# Patient Record
Sex: FEMALE | Race: WHITE | Employment: FULL TIME | URBAN - METROPOLITAN AREA
[De-identification: names, ages, dates, MRNs, and addresses within clinical notes are randomized per-mention and may not be internally consistent; named-entity substitution may affect disease eponyms.]

---

## 2018-07-30 ENCOUNTER — APPOINTMENT (OUTPATIENT)
Dept: GENERAL RADIOLOGY | Age: 38
End: 2018-07-30
Attending: PHYSICIAN ASSISTANT
Payer: COMMERCIAL

## 2018-07-30 ENCOUNTER — HOSPITAL ENCOUNTER (EMERGENCY)
Age: 38
Discharge: HOME | End: 2018-07-31
Attending: EMERGENCY MEDICINE
Payer: COMMERCIAL

## 2018-07-30 DIAGNOSIS — R73.9 HYPERGLYCEMIA: ICD-10-CM

## 2018-07-30 DIAGNOSIS — E11.622 DIABETIC SKIN ULCER (HCC): Primary | ICD-10-CM

## 2018-07-30 DIAGNOSIS — L98.499 DIABETIC SKIN ULCER (HCC): Primary | ICD-10-CM

## 2018-07-30 DIAGNOSIS — R03.0 ELEVATED BLOOD PRESSURE READING: ICD-10-CM

## 2018-07-30 LAB
ANION GAP SERPL CALC-SCNC: 3 MMOL/L (ref 3–18)
BASOPHILS # BLD: 0 K/UL (ref 0–0.1)
BASOPHILS NFR BLD: 0 % (ref 0–2)
BUN SERPL-MCNC: 13 MG/DL (ref 7–18)
BUN/CREAT SERPL: 17 (ref 12–20)
CALCIUM SERPL-MCNC: 8.6 MG/DL (ref 8.5–10.1)
CHLORIDE SERPL-SCNC: 105 MMOL/L (ref 100–108)
CO2 SERPL-SCNC: 31 MMOL/L (ref 21–32)
CREAT SERPL-MCNC: 0.77 MG/DL (ref 0.6–1.3)
DIFFERENTIAL METHOD BLD: ABNORMAL
EOSINOPHIL # BLD: 0.4 K/UL (ref 0–0.4)
EOSINOPHIL NFR BLD: 4 % (ref 0–5)
ERYTHROCYTE [DISTWIDTH] IN BLOOD BY AUTOMATED COUNT: 13.4 % (ref 11.6–14.5)
GLUCOSE BLD STRIP.AUTO-MCNC: 170 MG/DL (ref 70–110)
GLUCOSE SERPL-MCNC: 183 MG/DL (ref 74–99)
HCT VFR BLD AUTO: 36.5 % (ref 35–45)
HGB BLD-MCNC: 11.7 G/DL (ref 12–16)
LYMPHOCYTES # BLD: 2.1 K/UL (ref 0.9–3.6)
LYMPHOCYTES NFR BLD: 23 % (ref 21–52)
MCH RBC QN AUTO: 27.1 PG (ref 24–34)
MCHC RBC AUTO-ENTMCNC: 32.1 G/DL (ref 31–37)
MCV RBC AUTO: 84.7 FL (ref 74–97)
MONOCYTES # BLD: 0.5 K/UL (ref 0.05–1.2)
MONOCYTES NFR BLD: 5 % (ref 3–10)
NEUTS SEG # BLD: 6 K/UL (ref 1.8–8)
NEUTS SEG NFR BLD: 68 % (ref 40–73)
PLATELET # BLD AUTO: 222 K/UL (ref 135–420)
PMV BLD AUTO: 10.6 FL (ref 9.2–11.8)
POTASSIUM SERPL-SCNC: 4 MMOL/L (ref 3.5–5.5)
RBC # BLD AUTO: 4.31 M/UL (ref 4.2–5.3)
SODIUM SERPL-SCNC: 139 MMOL/L (ref 136–145)
WBC # BLD AUTO: 8.9 K/UL (ref 4.6–13.2)

## 2018-07-30 PROCEDURE — 80048 BASIC METABOLIC PNL TOTAL CA: CPT

## 2018-07-30 PROCEDURE — 73610 X-RAY EXAM OF ANKLE: CPT

## 2018-07-30 PROCEDURE — 82962 GLUCOSE BLOOD TEST: CPT

## 2018-07-30 PROCEDURE — 74011250637 HC RX REV CODE- 250/637: Performed by: PHYSICIAN ASSISTANT

## 2018-07-30 PROCEDURE — 99283 EMERGENCY DEPT VISIT LOW MDM: CPT

## 2018-07-30 PROCEDURE — 85025 COMPLETE CBC W/AUTO DIFF WBC: CPT

## 2018-07-30 RX ORDER — AMOXICILLIN AND CLAVULANATE POTASSIUM 875; 125 MG/1; MG/1
1 TABLET, FILM COATED ORAL 2 TIMES DAILY
Qty: 20 TAB | Refills: 0 | Status: SHIPPED | OUTPATIENT
Start: 2018-07-30 | End: 2018-08-09

## 2018-07-30 RX ORDER — AMOXICILLIN AND CLAVULANATE POTASSIUM 875; 125 MG/1; MG/1
1 TABLET, FILM COATED ORAL
Status: COMPLETED | OUTPATIENT
Start: 2018-07-30 | End: 2018-07-30

## 2018-07-30 RX ADMIN — AMOXICILLIN AND CLAVULANATE POTASSIUM 1 TABLET: 875; 125 TABLET, FILM COATED ORAL at 23:56

## 2018-07-31 VITALS
RESPIRATION RATE: 16 BRPM | HEART RATE: 66 BPM | SYSTOLIC BLOOD PRESSURE: 156 MMHG | DIASTOLIC BLOOD PRESSURE: 80 MMHG | TEMPERATURE: 98.3 F | OXYGEN SATURATION: 99 %

## 2018-07-31 NOTE — ED PROVIDER NOTES
EMERGENCY DEPARTMENT HISTORY AND PHYSICAL EXAM 
 
Date: 2018 Patient Name: Debbi Cheney History of Presenting Illness Chief Complaint Patient presents with  
 Skin Problem History Provided By: Patient Chief Complaint: leg wound Duration: 3 Weeks Timing:  Gradual and Worsening Location: right lower leg Quality: na 
Severity: Moderate Modifying Factors: none Associated Symptoms: denies any other associated signs or symptoms HPI: Debbi Cheney is a 40 y.o. female with a PMH of HTN, DM, HLD, Bipolar d/o who presents to the ER c/o wound to the right lower leg. Patient states she first noticed a small wound on the inside of her lower right leg about 3 weeks ago. Patient states it has continued to get larger since then. She reports not taking any medications for her chronic PMHx for \"a long time\". Patient states she has an appt for PCP follow up this Thursday. She also admits to tobacco use. She denied any known injury to her right ankle; however thinks it may have started as an insect bite. She has not put anything on the wound. Patient has a hx of DVT in the right leg almost 15 years prior; reports residual swelling to right lower leg which is normal per her. Patient denied any fevers, chills, SOB, chest pain, abd pain, N/V, drainage from wound and has no other symptoms or complaints. PCP: Matt Giraldo MD 
 
 
 
Past History Past Medical History: 
Past Medical History:  
Diagnosis Date  Diabetes ([facility] [address]  DVT (deep venous thrombosis) ([facility] [address]  High cholesterol  Hypertension  Hypothyroidism Past Surgical History: 
Past Surgical History:  
Procedure Laterality Date  HX  SECTION    
 VASCULAR SURGERY PROCEDURE UNLIST    
 filter for blood clots Family History: 
History reviewed. No pertinent family history. Social History: 
Social History Substance Use Topics  Smoking status: Current Every Day Smoker   Packs/day: 0.50  
 Smokeless tobacco: Never Used  Alcohol use No  
 
 
Allergies: 
No Known Allergies Review of Systems Review of Systems Constitutional: Negative for chills, fatigue and fever. HENT: Negative. Negative for sore throat. Eyes: Negative. Respiratory: Negative for cough and shortness of breath. Cardiovascular: Negative for chest pain and palpitations. Gastrointestinal: Negative for abdominal pain, nausea and vomiting. Genitourinary: Negative for dysuria. Musculoskeletal: Negative. Skin: Positive for wound. Ulcer to right ankle Neurological: Negative for dizziness, weakness, light-headedness and headaches. Psychiatric/Behavioral: Negative. All other systems reviewed and are negative. Physical Exam  
 
Vitals:  
 07/30/18 2134 07/30/18 2146 07/30/18 2148 BP: (!) 190/114 (!) 156/105 Pulse: 76 Resp: 18 Temp: 98.3 °F (36.8 °C) SpO2: 97%  100% Physical Exam  
Constitutional: She is oriented to person, place, and time. She appears well-developed and well-nourished. No distress. Morbidly obese HENT:  
Head: Normocephalic and atraumatic. Mouth/Throat: Oropharynx is clear and moist.  
Eyes: Conjunctivae are normal. No scleral icterus. Neck: Neck supple. No JVD present. No tracheal deviation present. Cardiovascular: Normal rate, regular rhythm and normal heart sounds. Pulmonary/Chest: Effort normal and breath sounds normal. No respiratory distress. She has no wheezes. She has no rales. She exhibits no tenderness. Abdominal: Soft. There is no tenderness. Musculoskeletal: Normal range of motion. Neurological: She is alert and oriented to person, place, and time. She has normal strength. Gait normal. GCS eye subscore is 4. GCS verbal subscore is 5. GCS motor subscore is 6. Skin: Skin is warm. Lesion noted. She is not diaphoretic.   
 
  
Associated mild erythema and skin discoloration along lower right leg with some swelling; normal per pt as she had clot over 10 years prior with residual swelling since Psychiatric: She has a normal mood and affect. Nursing note and vitals reviewed. Diagnostic Study Results Labs - Recent Results (from the past 12 hour(s)) GLUCOSE, POC Collection Time: 07/30/18  9:37 PM  
Result Value Ref Range Glucose (POC) 170 (H) 70 - 110 mg/dL CBC WITH AUTOMATED DIFF Collection Time: 07/30/18 10:14 PM  
Result Value Ref Range WBC 8.9 4.6 - 13.2 K/uL  
 RBC 4.31 4.20 - 5.30 M/uL  
 HGB 11.7 (L) 12.0 - 16.0 g/dL HCT 36.5 35.0 - 45.0 % MCV 84.7 74.0 - 97.0 FL  
 MCH 27.1 24.0 - 34.0 PG  
 MCHC 32.1 31.0 - 37.0 g/dL  
 RDW 13.4 11.6 - 14.5 % PLATELET 356 909 - 821 K/uL MPV 10.6 9.2 - 11.8 FL  
 NEUTROPHILS 68 40 - 73 % LYMPHOCYTES 23 21 - 52 % MONOCYTES 5 3 - 10 % EOSINOPHILS 4 0 - 5 % BASOPHILS 0 0 - 2 %  
 ABS. NEUTROPHILS 6.0 1.8 - 8.0 K/UL  
 ABS. LYMPHOCYTES 2.1 0.9 - 3.6 K/UL  
 ABS. MONOCYTES 0.5 0.05 - 1.2 K/UL  
 ABS. EOSINOPHILS 0.4 0.0 - 0.4 K/UL  
 ABS. BASOPHILS 0.0 0.0 - 0.1 K/UL  
 DF AUTOMATED METABOLIC PANEL, BASIC Collection Time: 07/30/18 10:14 PM  
Result Value Ref Range Sodium 139 136 - 145 mmol/L Potassium 4.0 3.5 - 5.5 mmol/L Chloride 105 100 - 108 mmol/L  
 CO2 31 21 - 32 mmol/L Anion gap 3 3.0 - 18 mmol/L Glucose 183 (H) 74 - 99 mg/dL BUN 13 7.0 - 18 MG/DL Creatinine 0.77 0.6 - 1.3 MG/DL  
 BUN/Creatinine ratio 17 12 - 20 GFR est AA >60 >60 ml/min/1.73m2 GFR est non-AA >60 >60 ml/min/1.73m2 Calcium 8.6 8.5 - 10.1 MG/DL Radiologic Studies -  
XR ANKLE RT MIN 3 V    (Results Pending) CT Results  (Last 48 hours) None CXR Results  (Last 48 hours) None Medical Decision Making I am the first provider for this patient. I reviewed the vital signs, available nursing notes, past medical history, past surgical history, family history and social history.  
 
Vital Signs-Reviewed the patient's vital signs. Records Reviewed: Nursing Notes ED Course:  
10:38 PM 
41 y/o female c/o ulcer to right medial ankle onset 3 weeks prior and worsening. Hx of diabetic foot ulcer in same spot previously. Pt states she has not been on any meds for a while. Has PCP follow up this Thursday. Denied any known injury to ankle. Also hx of DVT in past in same leg; states residual swelling is normal for her. Denied any recent fevers. Will plan on labs, imaging and will reeval.  Ulceration of medial lower leg just proximal to medial malleolus appears superficial in nature. Kristy Shields PA-C  
 
11:38 PM 
Labs reviewed and discussed with pt. No evidence of osteo on xray of lower leg/ankle. Will plan on wound dressing, and rx for augmentin. Advised pt to keep appt this Thursday with new PCP. All questions answered and patient in agreement with plan of care. Will plan for discharge. Kristy Shields PA-C Disposition: 
Discharged DISCHARGE NOTE:  
 
  Care plan outlined and precautions discussed. Patient has no new complaints, changes, or physical findings. Results of labs and imaging were reviewed with the patient. All medications were reviewed with the patient; will d/c home with augmentin. All of pt's questions and concerns were addressed. Patient was instructed and agrees to follow up with pcp, as well as to return to the ED upon further deterioration. Patient is ready to go home. Follow-up Information Follow up With Details Comments Contact Info [facility] EMERGENCY DEPT  If symptoms worsen [phone] [facility] 
[phone] Current Discharge Medication List  
  
START taking these medications Details  
amoxicillin-clavulanate (AUGMENTIN) 875-125 mg per tablet Take 1 Tab by mouth two (2) times a day for 10 days. Qty: 20 Tab, Refills: 0 Provider Notes (Medical Decision Making):  
 
Procedures: 
Procedures Diagnosis Clinical Impression: 1. Diabetic skin ulcer ([facility] [address] 2. Hyperglycemia 3. Elevated blood pressure reading

## 2018-07-31 NOTE — ED TRIAGE NOTES
Patient comes in complaining of a diabetic ulcer to the interior aspect of her right ankle. Patient states that she has not taken any diabetic or blood pressure medications since 2017. Patient states that she went through a manic episode.